# Patient Record
Sex: FEMALE | Race: WHITE | NOT HISPANIC OR LATINO | Employment: UNEMPLOYED
[De-identification: names, ages, dates, MRNs, and addresses within clinical notes are randomized per-mention and may not be internally consistent; named-entity substitution may affect disease eponyms.]

---

## 2017-09-17 ENCOUNTER — HEALTH MAINTENANCE LETTER (OUTPATIENT)
Age: 27
End: 2017-09-17

## 2019-11-08 ENCOUNTER — HEALTH MAINTENANCE LETTER (OUTPATIENT)
Age: 29
End: 2019-11-08

## 2020-02-23 ENCOUNTER — HEALTH MAINTENANCE LETTER (OUTPATIENT)
Age: 30
End: 2020-02-23

## 2020-12-06 ENCOUNTER — HEALTH MAINTENANCE LETTER (OUTPATIENT)
Age: 30
End: 2020-12-06

## 2021-07-06 ENCOUNTER — HOSPITAL ENCOUNTER (EMERGENCY)
Dept: EMERGENCY MEDICINE | Facility: CLINIC | Age: 31
Discharge: HOME | End: 2021-07-06
Attending: EMERGENCY MEDICINE
Payer: COMMERCIAL

## 2021-07-06 DIAGNOSIS — S09.90XA INJURY OF HEAD, INITIAL ENCOUNTER: ICD-10-CM

## 2021-07-06 DIAGNOSIS — S01.01XA LACERATION OF SCALP, INITIAL ENCOUNTER: ICD-10-CM

## 2021-07-06 ASSESSMENT — MIFFLIN-ST. JEOR: SCORE: 1959.2

## 2021-07-06 NOTE — ED TRIAGE NOTES
ED Triage Notes by Mare Caruso RN at 7/6/2021  3:29 PM     Author: Mare Caruso RN Service: -- Author Type: Registered Nurse    Filed: 7/6/2021  3:30 PM Date of Service: 7/6/2021  3:29 PM Status: Signed    : Mare Caruso, RN (Registered Nurse)       Patient was walking and tripped on the sidewalk and fell hitting her forehead, laceration noted. Denies blood thinner.

## 2021-07-07 NOTE — ED PROVIDER NOTES
ED Provider Notes by Billie Gupta DO at 7/6/2021  3:41 PM     Author: Billie Gupta DO Service: Emergency Medicine Author Type: Physician    Filed: 7/6/2021  9:24 PM Date of Service: 7/6/2021  3:41 PM Status: Signed    : Billei Gupta DO (Physician)       EMERGENCY DEPARTMENT ENCOUNTER      NAME: Yolis Lei  AGE: 31 y.o. female  YOB: 1990  MRN: [id number]  EVALUATION DATE & TIME: 7/6/2021  3:32 PM    PCP: Yareli Orellana    ED PROVIDER: Rebekah Gupta DO      Chief Complaint   Patient presents with   ? Fall   ? Head Laceration         FINAL IMPRESSION:  1. Laceration of scalp, initial encounter    2. Injury of head, initial encounter          ED COURSE & MEDICAL DECISION MAKING:    3:36 PM Met with patient for initial interview and exam. Discussed initial plan for care for their stay in the emergency department. PPE: Provider wore gloves and paper mask.  4:28 PM I checked to see if the patient was ready for the procedure.  4:33 PM I performed a nerve block.  4:51 PM I rechecked the patient and discussed findings.   4:56 PM I performed a laceration repair. I discussed with the patient the utility, limitations and findings of the exam/interventions/studies done during this visit as well as the list of differential diagnosis and symptoms to monitor/return to ER for.  Additional verbal discharge instructions were provided.    The patient was interviewed and examined.  History in the chart was reviewed.  31 y.o. female presents to the Emergency Department for evaluation of a fall and head injury.  She reports she is moving to a new house, was uneven concrete and she tripped and hit her head against the cement.  She is unsure if she passed out, however  states he saw this happened and by the time he got out of his car she was sitting up on the ground and awake.  No confusion.  She has had some mild headache since this happened.  No changes in vision.  She is not on any  anticoagulation.  Her neuro exam is nonfocal.  Given she is low risk, I do not feel that a head CT is warranted, however did offer and patient declined.  She does have a laceration above her left eyebrow, abrasions to her left knee and palms but otherwise a benign exam.  Her tetanus is up-to-date.  Wound was irrigated.  There is minimal gaping.  It is more amenable to Steri-Strips and glue.  This was performed.  Good approximation of the wound.  Discussed symptomatic care.  Discussed likely hematoma to the area and bruising.  Encourage return if any acute worsening of symptoms, confusion, seizure activity, neurologic deficits or any other concerns.    At the conclusion of the encounter I discussed  the results of all of the tests and the disposition with her.   All questions were answered.  The She acknowledged understanding and was involved in the decision making regarding the overall care plan.    MEDICATIONS GIVEN IN THE EMERGENCY:  Medications   lidocaine-epinephrine-tetracaine topical solution (LET) (3 mL Topical Given 7/6/21 1546)   methylcellulose powder 150 mg (150 mg Miscellaneous Given 7/6/21 1546)       NEW PRESCRIPTIONS STARTED AT TODAY'S ER VISIT  Discharge Medication List as of 7/6/2021  5:17 PM      CONTINUE these medications which have NOT CHANGED    Details   ibuprofen (ADVIL,MOTRIN) 800 MG tablet Take 1 tablet (800 mg total) by mouth 3 (three) times a day., Starting 8/31/2017, Until Discontinued, Print              =================================================================    HPI    Patient information was obtained from: the patient    Use of : N/A      Yolis Lei is a 31 y.o. female with a pertinent history of anxiety who presents to this ED by personal vehicle for evaluation of fall, head laceration.    The patient reports she was walking today when she tipped over a protruding piece of concrete.  She reports he head bounced 2x against the ground.  The patient denies loss of  consciousness.  Secondary to the fall, the patient reports a left knee abrasion, a laceration above her left eyebrow, bilateral abrasions to both palms, and an off/on migraine.  The patient denies the use of blood thinners.    The patient denies confusion, vomiting, nausea, vision changes, jaw problems, neck pain, gait problems, and any other symptoms or complaints at this time.      REVIEW OF SYSTEMS   Review of Systems   Constitutional: Negative for chills, fatigue and fever.   HENT: Negative for dental problem, facial swelling, nosebleeds, sore throat and trouble swallowing.         Negative for jaw problems.   Eyes: Negative for visual disturbance.   Respiratory: Negative for cough and shortness of breath.    Cardiovascular: Negative for chest pain.   Gastrointestinal: Negative for abdominal pain.   Musculoskeletal: Negative for arthralgias, gait problem and neck pain.   Skin: Positive for wound (left knee and bilateral palm abrasions). Negative for rash.        Positive for laceration above the left eyebrow.   Neurological: Positive for headaches ('migraines' - off/on). Negative for light-headedness and numbness.        Negative for loss of consciousness.   Psychiatric/Behavioral: Negative for confusion.   All other systems reviewed and are negative.      PAST MEDICAL HISTORY:  History reviewed. No pertinent past medical history.    PAST SURGICAL HISTORY:  History reviewed. No pertinent surgical history.        CURRENT MEDICATIONS:    No current facility-administered medications on file prior to encounter.      Current Outpatient Medications on File Prior to Encounter   Medication Sig   ? ibuprofen (ADVIL,MOTRIN) 800 MG tablet Take 1 tablet (800 mg total) by mouth 3 (three) times a day.       ALLERGIES:  No Known Allergies    FAMILY HISTORY:  History reviewed. No pertinent family history.    SOCIAL HISTORY:   Social History     Socioeconomic History   ? Marital status: Single     Spouse name: None   ? Number of  "children: None   ? Years of education: None   ? Highest education level: None   Occupational History   ? None   Social Needs   ? Financial resource strain: None   ? Food insecurity     Worry: None     Inability: None   ? Transportation needs     Medical: None     Non-medical: None   Tobacco Use   ? Smoking status: None   Substance and Sexual Activity   ? Alcohol use: None   ? Drug use: None   ? Sexual activity: None   Lifestyle   ? Physical activity     Days per week: None     Minutes per session: None   ? Stress: None   Relationships   ? Social connections     Talks on phone: None     Gets together: None     Attends [employer] service: None     Active member of club or organization: None     Attends meetings of clubs or organizations: None     Relationship status: None   ? Intimate partner violence     Fear of current or ex partner: None     Emotionally abused: None     Physically abused: None     Forced sexual activity: None   Other Topics Concern   ? None   Social History Narrative   ? None         PHYSICAL EXAM    VITAL SIGNS: /73 (Patient Position: Sitting)   Pulse 90   Temp 97  F (36.1  C) (Oral)   Resp 16   Ht 5' 3\" (1.6 m)   Wt (!) 280 lb (127 kg)   LMP 06/21/2021   SpO2 97%   BMI 49.60 kg/m     Constitutional:  Well developed, Well nourished,  HENT:  Normocephalic, Atraumatic, Bilateral external ears normal, No Hemotympanum, Oropharynx moist, No oral exudates, Nose normal. No facial trauma  Neck:  Normal range of motion, No c-spine tenderness, Supple, No stridor.   Eyes:  PERRL, EOMI, Conjunctiva normal, No discharge, Vision normal  Respiratory:  Equal breath sounds bilaterally, No respiratory distress, No wheezing, No chest tenderness or deformity.   Cardiovascular:  Normal heart rate, Normal rhythm, No murmurs, No rubs, No gallops.   GI:  Soft, No tenderness, No gaurding, No CVA tenderness, no echymosis.   Musculoskeletal:  Neurovascularly intact distally, No edema, No tenderness, No " cyanosis, No clubbing. Good range of motion in all major joints. No tenderness to palpation or major deformities noted.   Back:  No t-spine or l-spine tenderness, no step offs.   Integument:  Warm, Dry, No erythema, No rash. 1 cm laceration above the left eyebrow.  Superficial abrasion to left knee and bilateral palms.  Neurologic:  Alert & oriented x 3, Normal motor function, Normal sensory function, No focal deficits noted.   Psychiatric:  Affect normal, Judgment normal, Mood normal.        PROCEDURES:     PROCEDURE:  Laceration Repair   INDICATIONS: Laceration   PROCEDURE PROVIDER: Dr. Billie Gupta   SITE: Above the left eyebrow   TYPE/SIZE: A simple and superficial clean 1 cm laceration.   FUNCTIONAL ASSESSMENT: Distally/surrounding area sensation, circulation, motor and tendon function are intact.   MEDICATION: Lidocaine 1% with epinephrine.  Injecting 1 cc.   PREPARATION: irrigation with Normal Saline   DEBRIDEMENT: no debridement   CLOSURE:  Wound was closed in one layer. Skin closed with  Steristrips   Total number of steristrips placed: 4 steristrips and glue       I, Laz Serra, am serving as a scribe to document services personally performed by Dr. Gupta based on my observation and the provider's statements to me. IRebekah, DO attest that Laz Serra is acting in a scribe capacity, has observed my performance of the services and has documented them in accordance with my direction.    Rebekah Gupta DO  Emergency Medicine  [facility] EMERGENCY ROOM  [address] [facility] [address]  Dept: [phone]  Loc: [phone]     Billie Gupta DO  07/06/21 2127

## 2021-07-10 VITALS — BODY MASS INDEX: 49.61 KG/M2 | HEIGHT: 63 IN | WEIGHT: 280 LBS

## 2021-09-26 ENCOUNTER — HEALTH MAINTENANCE LETTER (OUTPATIENT)
Age: 31
End: 2021-09-26

## 2022-01-15 ENCOUNTER — HEALTH MAINTENANCE LETTER (OUTPATIENT)
Age: 32
End: 2022-01-15

## 2022-02-09 ENCOUNTER — OFFICE VISIT (OUTPATIENT)
Dept: OPHTHALMOLOGY | Facility: CLINIC | Age: 32
End: 2022-02-09
Payer: COMMERCIAL

## 2022-02-09 DIAGNOSIS — F07.81 POSTCONCUSSION SYNDROME: Primary | ICD-10-CM

## 2022-02-09 DIAGNOSIS — H52.12 MYOPIA, LEFT: ICD-10-CM

## 2022-02-09 DIAGNOSIS — H53.143 PHOTOPHOBIA OF BOTH EYES: ICD-10-CM

## 2022-02-09 DIAGNOSIS — H51.11 CONVERGENCE INSUFFICIENCY: ICD-10-CM

## 2022-02-09 PROCEDURE — 92004 COMPRE OPH EXAM NEW PT 1/>: CPT | Performed by: OPTOMETRIST

## 2022-02-09 PROCEDURE — 92015 DETERMINE REFRACTIVE STATE: CPT | Performed by: OPTOMETRIST

## 2022-02-09 ASSESSMENT — REFRACTION_WEARINGRX
SPECS_TYPE: SVL
OS_AXIS: 003
OS_CYLINDER: +0.50
OD_SPHERE: PLANO
OD_CYLINDER: SPHERE
OS_SPHERE: -1.00

## 2022-02-09 ASSESSMENT — VISUAL ACUITY
METHOD: SNELLEN - LINEAR
OD_CC: 20/20
OS_CC+: +1
OS_CC: 20/20

## 2022-02-09 ASSESSMENT — SLIT LAMP EXAM - LIDS
COMMENTS: NORMAL
COMMENTS: NORMAL

## 2022-02-09 ASSESSMENT — TONOMETRY
IOP_METHOD: ICARE
OD_IOP_MMHG: 22
OS_IOP_MMHG: 18

## 2022-02-09 ASSESSMENT — EXTERNAL EXAM - LEFT EYE: OS_EXAM: NORMAL

## 2022-02-09 ASSESSMENT — EXTERNAL EXAM - RIGHT EYE: OD_EXAM: NORMAL

## 2022-02-09 ASSESSMENT — REFRACTION_MANIFEST
OS_SPHERE: -1.00
OS_AXIS: 005
OS_CYLINDER: +0.50
OD_SPHERE: PLANO

## 2022-02-09 ASSESSMENT — CONF VISUAL FIELD
OD_NORMAL: 1
OS_NORMAL: 1
METHOD: COUNTING FINGERS

## 2022-02-09 ASSESSMENT — CUP TO DISC RATIO
OD_RATIO: 0.35
OS_RATIO: 0.35

## 2022-02-09 NOTE — PROGRESS NOTES
Assessment/Plan  (F07.81) Postconcussion syndrome  (primary encounter diagnosis)  Comment: Injured July 2021  Plan: Discussed findings with patient. Ocular health within normal limits. Some reduction in convergence, but unlikely that this is responsible for the bulk of patient's visual triggers and symptoms. Recommend trying FL-41 tint in habitual glasses to improve indoor light tolerance and reduce migraines. Follow up in about 1 month if symptoms fail to improve. Recommend patient continue with PT to improve motion sensitivity by improving fixation stability.      (H53.143) Photophobia of both eyes  Plan: See above plan. Try FL-41 glasses.     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 Recheck in 1 month if continued physical therapy for fixation instability and tinted glasses     (H52.12) Myopia, left  Plan: REFRACTION [[id number]]        SRx updated. Minimal change to distance Rx. Monitor.     Complete documentation of historical and exam elements from today's encounter can  be found in the full encounter summary report (not reduplicated in this progress  note). I personally obtained the chief complaint(s) and history of present illness. I  confirmed and edited as necessary the review of systems, past medical/surgical  history, family history, social history, and examination findings as documented by  others; and I examined the patient myself. I personally reviewed the relevant tests,  images, and reports as documented above. I formulated and edited as necessary the  assessment and plan and discussed the findings and management plan with the  patient and family.    Ovidio Burris OD

## 2022-02-09 NOTE — NURSING NOTE
"Chief Complaints and History of Present Illnesses   Patient presents with     COMPREHENSIVE EYE EXAM     Concussion Evaluation     Chief Complaint(s) and History of Present Illness(es)     COMPREHENSIVE EYE EXAM     Laterality: both eyes    Onset: 7 months ago    Context: h/o migraines    Associated symptoms: headache, floaters (historic - \"probably no changes\"), flashes (bright dots in vision - randomly appear - lasting a minute or two), previous episodes, photophobia, eye pain (dull achy - can get worse), dryness and nausea    Treatments tried: no treatments and glasses    Pain scale: 0/10              Concussion Evaluation               Comments     New pt here for concussion evaluation.   States was at here new home last July (2021), missed a step on the uneven pavement.  Pt hit her head, really hard on the cement. Had to have stitches for the face wound she incurred.  Pt has been managing HA's and dizziness are the biggest issues.    Ocular meds = none    Brian WERNER, JOSE 7:51 AM 02/09/2022                   "

## 2023-04-22 ENCOUNTER — HEALTH MAINTENANCE LETTER (OUTPATIENT)
Age: 33
End: 2023-04-22

## 2023-08-11 ENCOUNTER — TRANSCRIBE ORDERS (OUTPATIENT)
Dept: OTHER | Age: 33
End: 2023-08-11

## 2023-08-11 DIAGNOSIS — S06.0X9S CONCUSSION WITH LOSS OF CONSCIOUSNESS, SEQUELA (H): Primary | ICD-10-CM

## 2023-09-19 ENCOUNTER — OFFICE VISIT (OUTPATIENT)
Dept: OPTOMETRY | Facility: CLINIC | Age: 33
End: 2023-09-19
Attending: FAMILY MEDICINE
Payer: COMMERCIAL

## 2023-09-19 DIAGNOSIS — Z86.69 HISTORY OF MIGRAINE: ICD-10-CM

## 2023-09-19 DIAGNOSIS — H52.13 MYOPIA OF BOTH EYES: ICD-10-CM

## 2023-09-19 DIAGNOSIS — F07.81 POSTCONCUSSION SYNDROME: Primary | ICD-10-CM

## 2023-09-19 DIAGNOSIS — H53.143 PHOTOPHOBIA OF BOTH EYES: ICD-10-CM

## 2023-09-19 ASSESSMENT — EXTERNAL EXAM - RIGHT EYE: OD_EXAM: NORMAL

## 2023-09-19 ASSESSMENT — CONF VISUAL FIELD
OS_INFERIOR_TEMPORAL_RESTRICTION: 0
OD_INFERIOR_NASAL_RESTRICTION: 0
OD_SUPERIOR_NASAL_RESTRICTION: 0
OS_INFERIOR_NASAL_RESTRICTION: 0
OD_INFERIOR_TEMPORAL_RESTRICTION: 0
OS_NORMAL: 1
OD_NORMAL: 1
OD_SUPERIOR_TEMPORAL_RESTRICTION: 0
OS_SUPERIOR_TEMPORAL_RESTRICTION: 0
OS_SUPERIOR_NASAL_RESTRICTION: 0

## 2023-09-19 ASSESSMENT — REFRACTION_MANIFEST
OD_CYLINDER: +0.50
OD_SPHERE: -0.25
OS_AXIS: 003
OS_SPHERE: -1.00
OD_AXIS: 150
OS_CYLINDER: +0.50

## 2023-09-19 ASSESSMENT — VISUAL ACUITY
OS_CC: 20/20
OS_CC: J1+
OD_PH_CC: 20/25
OD_CC: J1+
OD_CC: 20/40-1
METHOD: SNELLEN - LINEAR

## 2023-09-19 ASSESSMENT — REFRACTION_WEARINGRX
OD_SPHERE: PLANO
OS_CYLINDER: +0.50
OS_SPHERE: -1.00
OS_AXIS: 005
SPECS_TYPE: SVL

## 2023-09-19 ASSESSMENT — EXTERNAL EXAM - LEFT EYE: OS_EXAM: NORMAL

## 2023-09-19 ASSESSMENT — CUP TO DISC RATIO
OS_RATIO: 0.4
OD_RATIO: 0.4

## 2023-09-19 ASSESSMENT — SLIT LAMP EXAM - LIDS
COMMENTS: 1+ MGD
COMMENTS: 1+ MGD

## 2023-09-19 ASSESSMENT — TONOMETRY
IOP_METHOD: ICARE
OS_IOP_MMHG: 12
OD_IOP_MMHG: 12

## 2023-09-20 NOTE — PROGRESS NOTES
Assessment/Plan  (F07.81) Postconcussion syndrome  (primary encounter diagnosis)  Comment: Injury in July 2021, patient continues to struggle with symptoms  Plan: Discussed findings with patient. Recommend obtaining glasses with FL-41 tint- prior pair may have blue blocker but does not appear to be FL-41. Return to clinic with prolonged symptoms or with any issues adjusting to new glasses.     (H53.143) Photophobia of both eyes  Comment: Following injury in July 2021  Plan: See above note.     (Z86.69) History of migraine  Plan: See above note. Because fluorescent lights are such a significant trigger, wearing tinted lenses under artificial lighting should be helpful.     (H52.13) Myopia of both eyes  Plan: REFRACTION [[id number]]        Discussed findings with patient. New spectacle prescription dispensed to patient. Patient is welcome to return to clinic with prolonged adaptation difficulties.             30 minutes were spent on the date of the encounter doing chart review, history and exam, documentation, and further activities as noted above.    Complete documentation of historical and exam elements from today's encounter can  be found in the full encounter summary report (not reduplicated in this progress  note). I personally obtained the chief complaint(s) and history of present illness. I  confirmed and edited as necessary the review of systems, past medical/surgical  history, family history, social history, and examination findings as documented by  others; and I examined the patient myself. I personally reviewed the relevant tests,  images, and reports as documented above. I formulated and edited as necessary the  assessment and plan and discussed the findings and management plan with the  patient and family.    Ovidio Burris, ASHLEY

## 2023-10-18 ENCOUNTER — TRANSCRIBE ORDERS (OUTPATIENT)
Dept: OTHER | Age: 33
End: 2023-10-18

## 2023-10-18 DIAGNOSIS — E66.01 MORBID OBESITY (H): Primary | ICD-10-CM

## 2024-01-15 ENCOUNTER — OFFICE VISIT (OUTPATIENT)
Dept: FAMILY MEDICINE | Facility: CLINIC | Age: 34
End: 2024-01-15
Payer: COMMERCIAL

## 2024-01-15 VITALS
DIASTOLIC BLOOD PRESSURE: 87 MMHG | BODY MASS INDEX: 51.91 KG/M2 | HEIGHT: 63 IN | TEMPERATURE: 98.9 F | HEART RATE: 94 BPM | SYSTOLIC BLOOD PRESSURE: 138 MMHG | WEIGHT: 293 LBS | OXYGEN SATURATION: 98 % | RESPIRATION RATE: 16 BRPM

## 2024-01-15 DIAGNOSIS — J01.90 ACUTE SINUSITIS WITH SYMPTOMS > 10 DAYS: Primary | ICD-10-CM

## 2024-01-15 PROCEDURE — 99213 OFFICE O/P EST LOW 20 MIN: CPT | Performed by: FAMILY MEDICINE

## 2024-01-15 RX ORDER — OMEPRAZOLE 40 MG/1
40 CAPSULE, DELAYED RELEASE ORAL PRN
COMMUNITY

## 2024-01-15 RX ORDER — ALBUTEROL SULFATE 90 UG/1
2 AEROSOL, METERED RESPIRATORY (INHALATION) EVERY 4 HOURS PRN
COMMUNITY
Start: 2023-07-31

## 2024-01-15 RX ORDER — BUSPIRONE HYDROCHLORIDE 7.5 MG/1
7.5 TABLET ORAL 2 TIMES DAILY
COMMUNITY
Start: 2023-07-31

## 2024-01-15 ASSESSMENT — PATIENT HEALTH QUESTIONNAIRE - PHQ9
SUM OF ALL RESPONSES TO PHQ QUESTIONS 1-9: 6
10. IF YOU CHECKED OFF ANY PROBLEMS, HOW DIFFICULT HAVE THESE PROBLEMS MADE IT FOR YOU TO DO YOUR WORK, TAKE CARE OF THINGS AT HOME, OR GET ALONG WITH OTHER PEOPLE: SOMEWHAT DIFFICULT
SUM OF ALL RESPONSES TO PHQ QUESTIONS 1-9: 6

## 2024-01-15 NOTE — PROGRESS NOTES
"  Assessment & Plan   Problem List Items Addressed This Visit    None  Visit Diagnoses       Acute sinusitis with symptoms > 10 days    -  Primary    Relevant Medications    albuterol (PROAIR HFA/PROVENTIL HFA/VENTOLIN HFA) 108 (90 Base) MCG/ACT inhaler    amoxicillin-clavulanate (AUGMENTIN) 875-125 MG tablet           I explained to the patient that her ear exam is quite normal today and that this points towards her ear symptoms likely being due to eustachian tube dysfunction.  This would also suggest that she still has a fair amount of inflammation going on from residual COVID infection.  We discussed that I cannot confirm that she has a bacterial sinus infection, however, with this being the prominent symptom over 2 weeks out from onset of symptoms, and that I do think that it is reasonable to empirically treat with a course of antibiotic at this time.       BMI:   Estimated body mass index is 52.71 kg/m  as calculated from the following:    Height as of this encounter: 1.6 m (5' 3\").    Weight as of this encounter: 135 kg (297 lb 9 oz).       MORRIS BASSETT MD  [facility]   Yolis is a 33 year old who presents today with a chief complaint of bilateral ear pain and sinus symptoms.  She reports onset of illness back on December 29 at which time she felt significant sinus pain and pressure, throat swelling and ear pain.  She took a home COVID test and it was positive.  She has developed a cough but states that the cough is dry and not the most bothersome symptom.  She is now over 2 weeks out and continues to have bilateral ear pain but more prominent sinus drainage including nasal drainage as well as postnasal drip.  She describes thick and at times green nasal drainage as well as intermittent sinus pain and pressure in her maxillary sinuses bilaterally.  She does continue to have hoarseness in her voice and has mentioned a dry cough.  She does not have any fevers or chills " or other complaints.  She states that she is prone to getting sinus infections each year.  Ear Problem (Bilat Ear pain L>R. Sx started 12/29/23.) and Sinus Problem (Nasal drainage. Sx started 12/29/23. 12/31/23 POS Home covid test. Hoarse voice started then too. Neg Covid test 01/04 and 01/05/24.)      1/15/2024     9:02 AM   Additional Questions   Roomed by sac   Accompanied by self         1/15/2024     9:02 AM   Patient Reported Additional Medications   Patient reports taking the following new medications no       Ear Problem    Sinus Problem   Associated symptoms include ear pain.   History of Present Illness       Reason for visit:  Possible sinus and ear infection.also laryngitis  Symptom onset:  3-4 weeks ago  Symptoms include:  Ear pain in both ears, nasal drainage, laryngitis  Symptom intensity:  Moderate  Symptom progression:  Staying the same  Had these symptoms before:  Yes  Has tried/received treatment for these symptoms:  Yes  Previous treatment was successful:  Yes  Prior treatment description:  Antibiotic of some sort for infection  What makes it worse:  Not getting better  What makes it better:  After nia tried everything at home for a month im fairly positive an antibiotic will help    She eats 2-3 servings of fruits and vegetables daily.She consumes 1 sweetened beverage(s) daily.She exercises with enough effort to increase her heart rate 20 to 29 minutes per day.  She exercises with enough effort to increase her heart rate 3 or less days per week. She is missing 7 dose(s) of medications per week.  She is not taking prescribed medications regularly due to remembering to take.         Review of Systems   HENT:  Positive for ear pain.             Objective    LMP 12/16/2023 (Exact Date)   There is no height or weight on file to calculate BMI.  Physical Exam   GENERAL: healthy, alert and no distress  HENT: ear canals and TM's normal, nose and mouth without ulcers or lesions  NECK: no adenopathy, no  asymmetry, masses, or scars and thyroid normal to palpation  RESP: lungs clear to auscultation - no rales, rhonchi or wheezes  CV: regular rate and rhythm, normal S1 S2, no S3 or S4, no murmur, click or rub, no peripheral edema and peripheral pulses strong

## 2024-01-15 NOTE — COMMUNITY RESOURCES LIST (ENGLISH)
01/15/2024   [facility]  N/A  For questions about this resource list or additional care needs, please contact your primary care clinic or care manager.  Phone: [phone]   Email: N/A   Address: [address] [place] [address]   Hours: N/A        Food and Nutrition       Food pantry  1  [facility] [place] - Food Shelf Distance: 2.97 miles      [facility]   [address] [place] [address]  Language: English  Hours: Mon - Wed 9:30 AM - 2:30 PM  Fees: Free   Phone: (740) 804-6851 Email: jeyson@[facility] Website: http://[facility]/[place]/     2  [facility] Distance: 2.97 miles      In-Person   [address] [place] [address]  Language: English, Latvian  Hours: Mon - Thu 9:30 AM - 3:00 PM  Fees: Free   Phone: (998) 144-8166 Email: center@[facility] Website: https://www.[facility]-resource-[place]/     SNAP application assistance  3  [employer] [facility] Distance: 6.57 miles      In-Person   [address] [place] [address]  Language: English, Latvian  Hours: Mon - Fri 8:30 AM - 5:00 PM  Fees: Free   Phone: (996) 215-4663 Email: info@clues.org Website: http://www.clues.org     4  [facility] Economic Support [facility] Distance: 6.91 miles      In-Person, Phone/[employer]   [address] [place] [address]  Language: English  Hours: Mon - Fri 8:00 AM - 4:30 PM  Fees: Free   Phone: (410) 964-3936 Email: sukhjinder@[facility]. Website: https://www.[facility].us/787/Economic-Support     Soup kitchen or free meals  5  [facility] - Thursday Night Community Meal Distance: 2.97 miles      In-Person   [address] [place] [address]  Language: English, Latvian  Hours: Thu 6:00 PM - 7:00 PM   Fees: Free   Phone: (828) 985-6296 Email: center@[facility] Website: https://www.[facility].[employer]/[facility]-resource-[place]/     6  [facility] - Homeless Outreach Services Team - Food Assistance Distance: 3.08 miles      Pickup   [address] [place] [address]  Language: English  Hours: Mon - Thu 9:30 AM - 3:30 PM  Fees: Free   Phone: (303) 489-4235 Email: office@[facility]"[employer] Website: https://www.[facility]"[employer]/Community Healthresource-[place]/          Transportation       Free or low-cost transportation  7  [employer] - Free or low-cost transportation Distance: 4.54 miles      In-Person   [address] [place] [address]  Language: English  Hours: Mon - Fri 9:00 AM - 5:00 PM  Fees: Free   Phone: (705) 442-9088 Email: info@[employer] Website: https://www.[employer]/     8  [facility]  Office - [facility] - Gas vouchers and transportation assistance - Free or low-cost transportation Distance: 6.34 miles      In-Person, Phone/Virtual   [address] [place] [address]  Language: English  Hours: Mon - Fri 8:00 AM - 12:00 PM , Mon - Fri 1:00 PM - 4:00 PM  Fees: Free   Phone: (622) 404-7010 Email: garrett@[facility].org Website: https://[facility].org/[facility]/social-services-office-[place]/     Transportation to medical appointments  9  [employer] Distance: 7.27 miles      In-Person   [address] [address]  Language: English  Hours: Mon - Thu 6:00 AM - 6:00 PM , Fri 6:00 AM - 5:00 PM  Fees: Insurance, Self Pay   Phone: (409) 750-2580 Email: office@[employer] Website: https://www.[employer]/     10  [facility] - Non-Emergency Medical Transportation Distance: 8.93 miles      In-Person   [address] [place] [address]  Language: English  Hours: Mon - Fri 8:00 AM - 4:00 PM Appt. Only  Fees: Self Pay   Phone: (291)  834-7295 Website: http://[facility].org/Medical-Services/Emergency-medical-services/Non-emergency-transportation/          Important Numbers & Websites       Emergency Services   911  [facility]   311  Poison Control   (670) 772-5934  Suicide Prevention Lifeline   (842) 424-7292 (TALK)  Child Abuse Hotline   (321) 448-7038 (4-A-Child)  Sexual Assault Hotline   (734) 220-3437 (HOPE)  National Runaway Safeline   (985) 589-1552 (RUNAWAY)  [employer] Talkline   (242) 142-6428  Substance Abuse Referral   (858) 102-5553 (HELP)

## 2024-01-18 ENCOUNTER — APPOINTMENT (OUTPATIENT)
Dept: OPTOMETRY | Facility: CLINIC | Age: 34
End: 2024-01-18
Payer: COMMERCIAL

## 2024-01-18 PROCEDURE — 92340 FIT SPECTACLES MONOFOCAL: CPT | Performed by: OPTOMETRIST

## 2024-06-29 ENCOUNTER — HEALTH MAINTENANCE LETTER (OUTPATIENT)
Age: 34
End: 2024-06-29

## 2024-08-14 ENCOUNTER — OFFICE VISIT (OUTPATIENT)
Dept: FAMILY MEDICINE | Facility: CLINIC | Age: 34
End: 2024-08-14
Payer: COMMERCIAL

## 2024-08-14 VITALS
RESPIRATION RATE: 18 BRPM | TEMPERATURE: 98 F | SYSTOLIC BLOOD PRESSURE: 123 MMHG | HEART RATE: 89 BPM | OXYGEN SATURATION: 100 % | DIASTOLIC BLOOD PRESSURE: 88 MMHG

## 2024-08-14 DIAGNOSIS — L02.01 FACIAL ABSCESS: Primary | ICD-10-CM

## 2024-08-14 PROCEDURE — 99213 OFFICE O/P EST LOW 20 MIN: CPT

## 2024-08-14 RX ORDER — SULFAMETHOXAZOLE/TRIMETHOPRIM 800-160 MG
1 TABLET ORAL 2 TIMES DAILY
Qty: 14 TABLET | Refills: 0 | Status: SHIPPED | OUTPATIENT
Start: 2024-08-14 | End: 2024-08-21

## 2024-08-14 NOTE — PROGRESS NOTES
Assessment & Plan     Facial abscess  34-year-old with facial abscess for 5 days duration that initially was worsening and then got better.  Did have clear drainage.  Not draining currently.  On palpation it is raised but no clear fluctuance.  Will hold off from incision and drainage given location for now and treat conservatively with Bactrim.  Patient to follow-up if not improving or worsening.  - sulfamethoxazole-trimethoprim (BACTRIM DS) 800-160 MG tablet; Take 1 tablet by mouth 2 times daily for 7 days    Return if symptoms worsen or fail to improve.    Subjective   Yolis is a 34 year old, presenting for the following health issues:  Insect Bites (Saturday insect bite on fore head swollen and painfully.)    HPI     Rash  Onset/Duration: 5 days  Description  Location: Forehead  Character: round, raised, red, clear draining fluid  Itching: mild  Intensity:  mild  Progression of Symptoms:  worsening and then improving  Accompanying signs and symptoms:   Fever: No  Body aches or joint pain: No  Sore throat symptoms: No  Recent cold symptoms: No  History:           Previous episodes of similar rash: None  New exposures:  None  Recent travel: No  Exposure to similar rash: No  Precipitating or alleviating factors: None  Therapies tried and outcome: none      Review of Systems  Constitutional, HEENT, cardiovascular, pulmonary, gi and gu systems are negative, except as otherwise noted.      Objective    /88 (BP Location: Right arm, Patient Position: Sitting, Cuff Size: Adult Regular)   Pulse 89   Temp 98  F (36.7  C) (Oral)   Resp 18   SpO2 100%   There is no height or weight on file to calculate BMI.  Physical Exam   GENERAL: alert and no distress  NECK: no adenopathy, no asymmetry, masses, or scars  RESP: lungs clear to auscultation - no rales, rhonchi or wheezes  CV: regular rate and rhythm, normal S1 S2, no S3 or S4, no murmur, click or rub, no peripheral edema  ABDOMEN: soft, nontender, no  hepatosplenomegaly, no masses and bowel sounds normal  MS: no gross musculoskeletal defects noted, no edema  SKIN: Raised roughly 1.5 to 2 cm in circumference lesion in between the eyebrows that is raised but not particularly fluctuant        Signed Electronically by: Philipp Ny, DO

## 2024-08-26 ASSESSMENT — SLEEP AND FATIGUE QUESTIONNAIRES
HOW LIKELY ARE YOU TO NOD OFF OR FALL ASLEEP WHILE SITTING AND READING: SLIGHT CHANCE OF DOZING
HOW LIKELY ARE YOU TO NOD OFF OR FALL ASLEEP WHILE SITTING QUIETLY AFTER LUNCH WITHOUT ALCOHOL: WOULD NEVER DOZE
HOW LIKELY ARE YOU TO NOD OFF OR FALL ASLEEP WHILE SITTING AND TALKING TO SOMEONE: WOULD NEVER DOZE
HOW LIKELY ARE YOU TO NOD OFF OR FALL ASLEEP WHILE WATCHING TV: WOULD NEVER DOZE
HOW LIKELY ARE YOU TO NOD OFF OR FALL ASLEEP WHEN YOU ARE A PASSENGER IN A CAR FOR AN HOUR WITHOUT A BREAK: WOULD NEVER DOZE
HOW LIKELY ARE YOU TO NOD OFF OR FALL ASLEEP WHILE SITTING INACTIVE IN A PUBLIC PLACE: WOULD NEVER DOZE
HOW LIKELY ARE YOU TO NOD OFF OR FALL ASLEEP WHILE LYING DOWN TO REST IN THE AFTERNOON WHEN CIRCUMSTANCES PERMIT: WOULD NEVER DOZE
HOW LIKELY ARE YOU TO NOD OFF OR FALL ASLEEP IN A CAR, WHILE STOPPED FOR A FEW MINUTES IN TRAFFIC: WOULD NEVER DOZE

## 2024-08-27 ENCOUNTER — OFFICE VISIT (OUTPATIENT)
Dept: SURGERY | Facility: CLINIC | Age: 34
End: 2024-08-27
Payer: COMMERCIAL

## 2024-08-27 VITALS
SYSTOLIC BLOOD PRESSURE: 124 MMHG | WEIGHT: 293 LBS | HEIGHT: 65 IN | DIASTOLIC BLOOD PRESSURE: 78 MMHG | BODY MASS INDEX: 48.82 KG/M2

## 2024-08-27 DIAGNOSIS — M79.672 PAIN IN BOTH FEET: ICD-10-CM

## 2024-08-27 DIAGNOSIS — M79.671 PAIN IN BOTH FEET: ICD-10-CM

## 2024-08-27 DIAGNOSIS — E66.01 SEVERE OBESITY (BMI >= 40) (H): Primary | ICD-10-CM

## 2024-08-27 DIAGNOSIS — G43.809 OTHER MIGRAINE WITHOUT STATUS MIGRAINOSUS, NOT INTRACTABLE: ICD-10-CM

## 2024-08-27 DIAGNOSIS — M54.50 LOW BACK PAIN, UNSPECIFIED BACK PAIN LATERALITY, UNSPECIFIED CHRONICITY, UNSPECIFIED WHETHER SCIATICA PRESENT: ICD-10-CM

## 2024-08-27 DIAGNOSIS — L68.0 HIRSUTISM: ICD-10-CM

## 2024-08-27 DIAGNOSIS — E66.01 MORBID OBESITY (H): ICD-10-CM

## 2024-08-27 DIAGNOSIS — K21.9 GASTROESOPHAGEAL REFLUX DISEASE WITHOUT ESOPHAGITIS: ICD-10-CM

## 2024-08-27 DIAGNOSIS — E28.2 PCOS (POLYCYSTIC OVARIAN SYNDROME): ICD-10-CM

## 2024-08-27 PROCEDURE — 99204 OFFICE O/P NEW MOD 45 MIN: CPT | Performed by: FAMILY MEDICINE

## 2024-08-27 RX ORDER — PROPRANOLOL HYDROCHLORIDE 40 MG/1
TABLET ORAL
COMMUNITY
Start: 2024-08-07

## 2024-08-27 RX ORDER — ELETRIPTAN HYDROBROMIDE 40 MG/1
TABLET, FILM COATED ORAL
COMMUNITY
Start: 2024-08-07

## 2024-08-27 NOTE — LETTER
"2024      Yolis Lei  [address]  [place] [address]      Dear Colleague,    Thank you for referring your patient, Yolis Lei, to the [facility] [place]. Please see a copy of my visit note below.        New Medical Weight Management Consult    PATIENT:  Yolis Lei  MRN:         [id number]  :         1990  ANISHA:         2024    Dear Dr. Bertrand,    I had the pleasure of seeing your patient, Yolis Lei. Full intake/assessment was done to determine barriers to weight loss success and develop a treatment plan. Yolis Lei is a 34 year old female interested in treatment of medical problems associated with excess weight. She has a height of 5' 4.5\", a weight of 297 lbs 0 oz, and the calculated Body mass index is 50.19 kg/m .     ASSESSMENT/PLAN:  Genetic Predisposition/ PCOS with insulin resistance, hirsutism, hx ovarian cysts, menstrual irregularity  Secondary infertility  High Stress  Weight loss from 310# to current 297 as Fiance also meeting with RD and has accompanied him and made significant changes, losing 13#.   Has been on Metformin in the past  Has not been able to get pregnant after having Geronimo 10 years ago.      RD for MNT  Zepbound, Plan B Phentermine and metformin. Consider adding Topamax if unable to eliminate soda as it can help with migrane and work synergistically with phentermine(not if not using birth control)  Labs  During off season- counting steps will be helpful-explored family intentional activities such as walks after work, [place], [place].    We discussed Bariatric Basics including:  -eating 3 meals daily  -eating protein first  -eating slowly, chewing food well  -avoiding/limiting calorie containing beverages  -choosing wheat, not white with breads, crackers, pastas, adarsh, bagels, tortillas, rice  -limiting restaurant or cafeteria eating to twice a week or less    We discussed the importance " "of restorative sleep and stress management in maintaining a healthy weight.    We reviewed medications associated with weight gain.    We discussed insulin resistance and glycemic index as it relates to appetite and weight control.     We discussed the National Weight Control Registry healthy weight maintenance strategies and ways to optimize metabolism.  We discussed the importance of physical activity including cardiovascular and strength training in maintaining a healthier weight and explored viable options.    We discussed medications available for weight loss including Phentermine, Phendimetrazine, Topamax, Qsymia, Diethylproprion, Orlistat, Contrave (Bupropion/Naltrexone), Saxenda (Liraglutide), Wegovy (Semaglutide), Zepbound (Tirzepatide) and Vyvanse (for Binge Eating Disorder). We discussed the risks and benefits of each. We discussed indications, contraindications, potential side effects, and estimated costs of each. Literature was provided. Yolis understands that not using a weight loss medication is an option.        She has the following co-morbidities:        8/26/2024    12:58 PM   --   I have the following health issues associated with obesity Polycystic Ovarian Syndrome   I have the following symptoms associated with obesity Infertility (Difficulty Getting Pregnant)    Depression    Lower Extremity Swelling    Back Pain    Hip Pain               8/26/2024    12:58 PM   Referring Provider   Please name the provider who referred you to Medical Weight Management  If you do not know, please answer \"I Don't Know\" I dont know           8/26/2024    12:58 PM   Weight History   How concerned are you about your weight? Very Concerned   I became overweight As an Adult   The following factors have contributed to my weight gain Lack of Exercise    Stress    Other   Please list the other factors PCOS   I have tried the following methods to lose weight Watching Portions or Calories    Exercise   My lowest weight " since age 19 was 150   My highest weight since age 19 was 300   The most weight I have ever lost was (lbs) 20   I have the following family history of obesity/being overweight My mother is overweight   How has your weight changed over the last year? Gained   How many pounds? 20           8/26/2024    12:58 PM   Diet Recall Review with Patient   If you do eat breakfast, what types of food do you eat? Eggs, yogurt, sausage   If you do eat lunch, what types of food do you typically eat? Chicken sandwich, salads, burger   If you do eat supper, what types of food do you typically eat? Chicken, small portion of potatoes, broccoli, corn, beans   How many glasses of juice do you drink in a typical day? 0   How many of glasses of milk do you drink in a typical day? 1   If you do drink milk, what type? 1%   How many 8oz glasses of sugar containing drinks such as Jarocho-Aid/sweet tea do you drink in a day? 0   How many cans/bottles of sugar pop/soda/tea/sports drinks do you drink in a day? 1   How many cans/bottles of diet pop/soda/tea or sports drink do you drink in a day? 1   How often do you have a drink of alcohol? Monthly or Less   If you do drink, how many drinks might you have in a day? 1 or 2           8/26/2024    12:58 PM   Eating Habits   Generally, my meals include foods like these bread, pasta, rice, potatoes, corn, crackers, sweet dessert, pop, or juice A Few Times a Week   Generally, my meals include foods like these fried meats, brats, burgers, french fries, pizza, cheese, chips, or ice cream A Few Times a Week   Eat fast food (like McDonalds, Burger Dipak, Taco Bell) Once a Week   Eat at a buffet or sit-down restaurant Less Than Weekly   Eat most of my meals in front of the TV or computer Less Than Weekly   Often skip meals, eat at random times, have no regular eating times A Few Times a Week   Rarely sit down for a meal but snack or graze throughout Less Than Weekly   Eat extra snacks between meals Less Than  Weekly   Eat most of my food at the end of the day Never   Eat in the middle of the night or wake up at night to eat Less Than Weekly   Eat extra snacks to prevent or correct low blood sugar Less Than Weekly   Eat to prevent acid reflux or stomach pain Never   Worry about not having enough food to eat Never   I eat when I am depressed Once a Week   I eat when I am stressed Less Than Weekly   I eat when I am bored Less Than Weekly   I eat when I am anxious Never   I eat when I am happy or as a reward Less Than Weekly   I feel hungry all the time even if I just have eaten Never   Feeling full is important to me Never   I finish all the food on my plate even if I am already full Never   I can't resist eating delicious food or walk past the good food/smell Never   I eat/snack without noticing that I am eating Never   I eat when I am preparing the meal Never   I eat more than usual when I see others eating Never   I have trouble not eating sweets, ice cream, cookies, or chips if they are around the house Never   I think about food all day Never   What foods, if any, do you crave? Cheese   Please list any other foods you crave? I dont really crave anything.           8/26/2024    12:58 PM   Amount of Food   I feel out of control when eating Never   I eat a large amount of food, like a loaf of bread, a box of cookies, a pint/quart of ice cream, all at once Never   I eat a large amount of food even when I am not hungry Never   I eat rapidly Never   I eat alone because I feel embarrassed and do not want others to see how much I have eaten Never   I eat until I am uncomfortably full Never   I feel bad, disgusted, or guilty after I overeat Monthly           8/26/2024    12:58 PM   Activity/Exercise History   How much of a typical 12 hour day do you spend sitting? Less Than Half the Day   How much of a typical 12 hour day do you spend lying down? Less Than Half the Day   How much of a typical day do you spend walking/standing?  Most of the Day   How many hours (not including work) do you spend on the TV/Video Games/Computer/Tablet/Phone? 2-3 Hours   How many times a week are you active for the purpose of exercise? Never   What keeps you from being more active? Other   How many total minutes do you spend doing some activity for the purpose of exercising when you exercise? Less Than 15 Minutes       PAST MEDICAL HISTORY:  Past Medical History:   Diagnosis Date     Foot pain      Gastroesophageal reflux disease without esophagitis      Hirsutism      Low back pain      Migraine      PCOS (polycystic ovarian syndrome)      Severe obesity (BMI >= 40) (H)            8/26/2024    12:58 PM   Work/Social History Reviewed With Patient   My employment status is Part-Time   My job is    How much of your job is spent on the computer or phone? Less Than 50%   How many hours do you spend commuting to work daily? 1 hour   What is your marital status? /In a Relationship   If in a relationship, is your significant other overweight? Yes   If you have children, are they overweight? No   Who do you live with? My fiance and son   Who does the food shopping? Both of us           8/26/2024    12:58 PM   Mental Health History Reviewed With Patient   Have you ever been physically or sexually abused? No   How often in the past 2 weeks have you felt little interest or pleasure in doing things? For Several Days   Over the past 2 weeks how often have you felt down, depressed, or hopeless? For Several Days           8/26/2024    12:58 PM   Sleep History Reviewed With Patient   How many hours do you sleep at night? 7       MEDICATIONS:   Current Outpatient Medications   Medication Sig Dispense Refill     albuterol (PROAIR HFA/PROVENTIL HFA/VENTOLIN HFA) 108 (90 Base) MCG/ACT inhaler Inhale 2 puffs into the lungs every 4 hours as needed       busPIRone (BUSPAR) 7.5 MG tablet Take 7.5 mg by mouth 2 times daily       IBU OR None Entered        "omeprazole (PRILOSEC) 40 MG DR capsule Take 40 mg by mouth as needed       propranolol (INDERAL) 40 MG tablet TAKE 2 TABLETS BY MOUTH IN THE MORNING AND 2 TABLETS AT BEDTIME*       RELPAX 40 MG tablet TAKE 1 TABLET BY MOUTH AT THE ONSET OF MIGRAINE.  OK TO REPEAT IN 2 HOURS.  DO NOT EXCEED 2 TABS IN 24 HOURS.*       [START ON 11/19/2024] tirzepatide-Weight Management (ZEPBOUND) 10 MG/0.5ML prefilled pen Inject 0.5 mLs (10 mg) subcutaneously every 7 days for 28 days. Do not start before November 19, 2024. 2 mL 0     [START ON 12/17/2024] tirzepatide-Weight Management (ZEPBOUND) 12.5 MG/0.5ML prefilled pen Inject 0.5 mLs (12.5 mg) subcutaneously every 7 days for 28 days. Do not start before December 17, 2024. 2 mL 0     [START ON 1/14/2025] tirzepatide-Weight Management (ZEPBOUND) 15 MG/0.5ML prefilled pen Inject 0.5 mLs (15 mg) subcutaneously every 7 days. Do not start before January 14, 2025. 6 mL 3     tirzepatide-Weight Management (ZEPBOUND) 2.5 MG/0.5ML prefilled pen Inject 0.5 mLs (2.5 mg) subcutaneously every 7 days for 28 days. 2 mL 0     [START ON 9/24/2024] tirzepatide-Weight Management (ZEPBOUND) 5 MG/0.5ML prefilled pen Inject 0.5 mLs (5 mg) subcutaneously every 7 days for 28 days. Do not start before September 24, 2024. 2 mL 0     [START ON 10/22/2024] tirzepatide-Weight Management (ZEPBOUND) 7.5 MG/0.5ML prefilled pen Inject 0.5 mLs (7.5 mg) subcutaneously every 7 days for 28 days. Do not start before October 22, 2024. 2 mL 0     TYLENOL OR as directed         ALLERGIES:   No Known Allergies    PHYSICAL EXAM:  /78   Ht 1.638 m (5' 4.5\")   Wt 134.7 kg (297 lb)   LMP 07/26/2024 (Exact Date)   BMI 50.19 kg/m      Waist circumference: 56 cm (hips 60)    Wt Readings from Last 4 Encounters:   08/27/24 134.7 kg (297 lb)   01/15/24 135 kg (297 lb 9 oz)   08/05/09 85.4 kg (188 lb 3.2 oz) (96%, Z= 1.80)*   10/16/08 89.8 kg (198 lb) (98%, Z= 1.97)*     * Growth percentiles are based on CDC (Girls, 2-20 " "Years) data.   Pleasant and in no distress  Neck 15\" Mallampati 2-3+  Heart regular  Lungs clear  Abdominal circumference 56\"  A & O x 3  Euthymic  Gait normal  Skin hirsutism    FOLLOW-UP:   Scheduled     TIME: 45 min spent on evaluation, management, counseling, education, & motivational interviewing    Sincerely,    Karissa Morales MD            Again, thank you for allowing me to participate in the care of your patient.        Sincerely,        Karissa Morales MD  "

## 2024-08-27 NOTE — PROGRESS NOTES
"    New Medical Weight Management Consult    PATIENT:  Yolis Lei  MRN:         [id number]  :         1990  ANISHA:         2024    Dear Dr. Bertrand,    I had the pleasure of seeing your patient, Yolis Lei. Full intake/assessment was done to determine barriers to weight loss success and develop a treatment plan. Yolis Lei is a 34 year old female interested in treatment of medical problems associated with excess weight. She has a height of 5' 4.5\", a weight of 297 lbs 0 oz, and the calculated Body mass index is 50.19 kg/m .     ASSESSMENT/PLAN:  Genetic Predisposition/ PCOS with insulin resistance, hirsutism, hx ovarian cysts, menstrual irregularity  Secondary infertility  High Stress  Weight loss from 310# to current 297 as Fiance also meeting with RD and has accompanied him and made significant changes, losing 13#.   Has been on Metformin in the past  Has not been able to get pregnant after having Geronimo 10 years ago.      RD for MNT  Zepbound, Plan B Phentermine and metformin. Consider adding Topamax if unable to eliminate soda as it can help with migrane and work synergistically with phentermine(not if not using birth control)  Labs  During off season- counting steps will be helpful-explored family intentional activities such as walks after work, [place]    We discussed Bariatric Basics including:  -eating 3 meals daily  -eating protein first  -eating slowly, chewing food well  -avoiding/limiting calorie containing beverages  -choosing wheat, not white with breads, crackers, pastas, adarsh, bagels, tortillas, rice  -limiting restaurant or cafeteria eating to twice a week or less    We discussed the importance of restorative sleep and stress management in maintaining a healthy weight.    We reviewed medications associated with weight gain.    We discussed insulin resistance and glycemic index as it relates to appetite and weight control.     We discussed the " "National Weight Control Registry healthy weight maintenance strategies and ways to optimize metabolism.  We discussed the importance of physical activity including cardiovascular and strength training in maintaining a healthier weight and explored viable options.    We discussed medications available for weight loss including Phentermine, Phendimetrazine, Topamax, Qsymia, Diethylproprion, Orlistat, Contrave (Bupropion/Naltrexone), Saxenda (Liraglutide), Wegovy (Semaglutide), Zepbound (Tirzepatide) and Vyvanse (for Binge Eating Disorder). We discussed the risks and benefits of each. We discussed indications, contraindications, potential side effects, and estimated costs of each. Literature was provided. Yolis understands that not using a weight loss medication is an option.        She has the following co-morbidities:        8/26/2024    12:58 PM   --   I have the following health issues associated with obesity Polycystic Ovarian Syndrome   I have the following symptoms associated with obesity Infertility (Difficulty Getting Pregnant)    Depression    Lower Extremity Swelling    Back Pain    Hip Pain               8/26/2024    12:58 PM   Referring Provider   Please name the provider who referred you to Medical Weight Management  If you do not know, please answer \"I Don't Know\" I dont know           8/26/2024    12:58 PM   Weight History   How concerned are you about your weight? Very Concerned   I became overweight As an Adult   The following factors have contributed to my weight gain Lack of Exercise    Stress    Other   Please list the other factors PCOS   I have tried the following methods to lose weight Watching Portions or Calories    Exercise   My lowest weight since age 19 was 150   My highest weight since age 19 was 300   The most weight I have ever lost was (lbs) 20   I have the following family history of obesity/being overweight My mother is overweight   How has your weight changed over the last year? " Gained   How many pounds? 20           8/26/2024    12:58 PM   Diet Recall Review with Patient   If you do eat breakfast, what types of food do you eat? Eggs, yogurt, sausage   If you do eat lunch, what types of food do you typically eat? Chicken sandwich, salads, burger   If you do eat supper, what types of food do you typically eat? Chicken, small portion of potatoes, broccoli, corn, beans   How many glasses of juice do you drink in a typical day? 0   How many of glasses of milk do you drink in a typical day? 1   If you do drink milk, what type? 1%   How many 8oz glasses of sugar containing drinks such as Jarocho-Aid/sweet tea do you drink in a day? 0   How many cans/bottles of sugar pop/soda/tea/sports drinks do you drink in a day? 1   How many cans/bottles of diet pop/soda/tea or sports drink do you drink in a day? 1   How often do you have a drink of alcohol? Monthly or Less   If you do drink, how many drinks might you have in a day? 1 or 2           8/26/2024    12:58 PM   Eating Habits   Generally, my meals include foods like these bread, pasta, rice, potatoes, corn, crackers, sweet dessert, pop, or juice A Few Times a Week   Generally, my meals include foods like these fried meats, brats, burgers, french fries, pizza, cheese, chips, or ice cream A Few Times a Week   Eat fast food (like [employer], [facility], [employer]) Once a Week   Eat at a buffet or sit-down restaurant Less Than Weekly   Eat most of my meals in front of the TV or computer Less Than Weekly   Often skip meals, eat at random times, have no regular eating times A Few Times a Week   Rarely sit down for a meal but snack or graze throughout Less Than Weekly   Eat extra snacks between meals Less Than Weekly   Eat most of my food at the end of the day Never   Eat in the middle of the night or wake up at night to eat Less Than Weekly   Eat extra snacks to prevent or correct low blood sugar Less Than Weekly   Eat to prevent acid reflux or stomach pain  Never   Worry about not having enough food to eat Never   I eat when I am depressed Once a Week   I eat when I am stressed Less Than Weekly   I eat when I am bored Less Than Weekly   I eat when I am anxious Never   I eat when I am happy or as a reward Less Than Weekly   I feel hungry all the time even if I just have eaten Never   Feeling full is important to me Never   I finish all the food on my plate even if I am already full Never   I can't resist eating delicious food or walk past the good food/smell Never   I eat/snack without noticing that I am eating Never   I eat when I am preparing the meal Never   I eat more than usual when I see others eating Never   I have trouble not eating sweets, ice cream, cookies, or chips if they are around the house Never   I think about food all day Never   What foods, if any, do you crave? Cheese   Please list any other foods you crave? I dont really crave anything.           8/26/2024    12:58 PM   Amount of Food   I feel out of control when eating Never   I eat a large amount of food, like a loaf of bread, a box of cookies, a pint/quart of ice cream, all at once Never   I eat a large amount of food even when I am not hungry Never   I eat rapidly Never   I eat alone because I feel embarrassed and do not want others to see how much I have eaten Never   I eat until I am uncomfortably full Never   I feel bad, disgusted, or guilty after I overeat Monthly           8/26/2024    12:58 PM   Activity/Exercise History   How much of a typical 12 hour day do you spend sitting? Less Than Half the Day   How much of a typical 12 hour day do you spend lying down? Less Than Half the Day   How much of a typical day do you spend walking/standing? Most of the Day   How many hours (not including work) do you spend on the TV/Video Games/Computer/Tablet/Phone? 2-3 Hours   How many times a week are you active for the purpose of exercise? Never   What keeps you from being more active? Other   How many  total minutes do you spend doing some activity for the purpose of exercising when you exercise? Less Than 15 Minutes       PAST MEDICAL HISTORY:  Past Medical History:   Diagnosis Date    Foot pain     Gastroesophageal reflux disease without esophagitis     Hirsutism     Low back pain     Migraine     PCOS (polycystic ovarian syndrome)     Severe obesity (BMI >= 40) (H)            8/26/2024    12:58 PM   Work/Social History Reviewed With Patient   My employment status is Part-Time   My job is    How much of your job is spent on the computer or phone? Less Than 50%   How many hours do you spend commuting to work daily? 1 hour   What is your marital status? /In a Relationship   If in a relationship, is your significant other overweight? Yes   If you have children, are they overweight? No   Who do you live with? My fiance and son   Who does the food shopping? Both of us           8/26/2024    12:58 PM   Mental Health History Reviewed With Patient   Have you ever been physically or sexually abused? No   How often in the past 2 weeks have you felt little interest or pleasure in doing things? For Several Days   Over the past 2 weeks how often have you felt down, depressed, or hopeless? For Several Days           8/26/2024    12:58 PM   Sleep History Reviewed With Patient   How many hours do you sleep at night? 7       MEDICATIONS:   Current Outpatient Medications   Medication Sig Dispense Refill    albuterol (PROAIR HFA/PROVENTIL HFA/VENTOLIN HFA) 108 (90 Base) MCG/ACT inhaler Inhale 2 puffs into the lungs every 4 hours as needed      busPIRone (BUSPAR) 7.5 MG tablet Take 7.5 mg by mouth 2 times daily      IBU OR None Entered      omeprazole (PRILOSEC) 40 MG DR capsule Take 40 mg by mouth as needed      propranolol (INDERAL) 40 MG tablet TAKE 2 TABLETS BY MOUTH IN THE MORNING AND 2 TABLETS AT BEDTIME*      RELPAX 40 MG tablet TAKE 1 TABLET BY MOUTH AT THE ONSET OF MIGRAINE.  OK TO REPEAT IN 2 HOURS.   "DO NOT EXCEED 2 TABS IN 24 HOURS.*      [START ON 11/19/2024] tirzepatide-Weight Management (ZEPBOUND) 10 MG/0.5ML prefilled pen Inject 0.5 mLs (10 mg) subcutaneously every 7 days for 28 days. Do not start before November 19, 2024. 2 mL 0    [START ON 12/17/2024] tirzepatide-Weight Management (ZEPBOUND) 12.5 MG/0.5ML prefilled pen Inject 0.5 mLs (12.5 mg) subcutaneously every 7 days for 28 days. Do not start before December 17, 2024. 2 mL 0    [START ON 1/14/2025] tirzepatide-Weight Management (ZEPBOUND) 15 MG/0.5ML prefilled pen Inject 0.5 mLs (15 mg) subcutaneously every 7 days. Do not start before January 14, 2025. 6 mL 3    tirzepatide-Weight Management (ZEPBOUND) 2.5 MG/0.5ML prefilled pen Inject 0.5 mLs (2.5 mg) subcutaneously every 7 days for 28 days. 2 mL 0    [START ON 9/24/2024] tirzepatide-Weight Management (ZEPBOUND) 5 MG/0.5ML prefilled pen Inject 0.5 mLs (5 mg) subcutaneously every 7 days for 28 days. Do not start before September 24, 2024. 2 mL 0    [START ON 10/22/2024] tirzepatide-Weight Management (ZEPBOUND) 7.5 MG/0.5ML prefilled pen Inject 0.5 mLs (7.5 mg) subcutaneously every 7 days for 28 days. Do not start before October 22, 2024. 2 mL 0    TYLENOL OR as directed         ALLERGIES:   No Known Allergies    PHYSICAL EXAM:  /78   Ht 1.638 m (5' 4.5\")   Wt 134.7 kg (297 lb)   LMP 07/26/2024 (Exact Date)   BMI 50.19 kg/m      Waist circumference: 56 cm (hips 60)    Wt Readings from Last 4 Encounters:   08/27/24 134.7 kg (297 lb)   01/15/24 135 kg (297 lb 9 oz)   08/05/09 85.4 kg (188 lb 3.2 oz) (96%, Z= 1.80)*   10/16/08 89.8 kg (198 lb) (98%, Z= 1.97)*     * Growth percentiles are based on CDC (Girls, 2-20 Years) data.   Pleasant and in no distress  Neck 15\" Mallampati 2-3+  Heart regular  Lungs clear  Abdominal circumference 56\"  A & O x 3  Euthymic  Gait normal  Skin hirsutism    FOLLOW-UP:   Scheduled     TIME: 45 min spent on evaluation, management, counseling, education, & " motivational interviewing    Sincerely,    Karissa Morales MD

## 2024-08-27 NOTE — PATIENT INSTRUCTIONS
[facility] Bariatric Basics    Remember to:    -Eat 3 meals a day (not 2, not 5) Chew your food well/SLOW down  -Eat your protein first  -Be a water drinker/Minize liquid calories (no regular pop, no juice) skim or 1% milk OK  -Sleep 7-8 hours each night. Address sleep if problematic  -Stress management is important. Address if problematic  -Move-8000 steps daily Muscle: maintain your muscle mass (strength training 2X/wk)  -Wheat, not white (bread, pasta, crackers, adarsh, bagels, tortillas, rice)  -Limit restaurant, cafeteria, take out, drive through to 2 times per week or less  -Minimize caffeine, alcohol, and night-time snacking  -Consider keeping a food diary (i.e. My Fitness Pal, Lose It, or other food tracker)  -Follow up with the dietitian      **Some lean proteins: chicken, turkey, tuna, salmon, crab, fish, shrimp, scallops, lobster, lean cuts of beef and pork, luncheon meats, veggie burgers, beans (black, lima, garbanzo, padilla, kidney, refried), chile, cottage cheese, string cheese, other cheese, eggs, tofu, peanut butter, nuts, vegan crumbles, greek yogurt

## 2024-09-03 ENCOUNTER — LAB (OUTPATIENT)
Dept: LAB | Facility: CLINIC | Age: 34
End: 2024-09-03
Payer: COMMERCIAL

## 2024-09-03 DIAGNOSIS — E66.01 SEVERE OBESITY (BMI >= 40) (H): ICD-10-CM

## 2024-09-03 LAB
ERYTHROCYTE [DISTWIDTH] IN BLOOD BY AUTOMATED COUNT: 12.4 % (ref 10–15)
HBA1C MFR BLD: 5 % (ref 0–5.6)
HCT VFR BLD AUTO: 43 % (ref 35–47)
HGB BLD-MCNC: 14 G/DL (ref 11.7–15.7)
MCH RBC QN AUTO: 27.5 PG (ref 26.5–33)
MCHC RBC AUTO-ENTMCNC: 32.6 G/DL (ref 31.5–36.5)
MCV RBC AUTO: 84 FL (ref 78–100)
PLATELET # BLD AUTO: 230 10E3/UL (ref 150–450)
RBC # BLD AUTO: 5.1 10E6/UL (ref 3.8–5.2)
WBC # BLD AUTO: 10.2 10E3/UL (ref 4–11)

## 2024-09-03 PROCEDURE — 83718 ASSAY OF LIPOPROTEIN: CPT

## 2024-09-03 PROCEDURE — 85027 COMPLETE CBC AUTOMATED: CPT

## 2024-09-03 PROCEDURE — 83970 ASSAY OF PARATHORMONE: CPT

## 2024-09-03 PROCEDURE — 83036 HEMOGLOBIN GLYCOSYLATED A1C: CPT

## 2024-09-03 PROCEDURE — 83721 ASSAY OF BLOOD LIPOPROTEIN: CPT

## 2024-09-03 PROCEDURE — 36415 COLL VENOUS BLD VENIPUNCTURE: CPT

## 2024-09-03 PROCEDURE — 82607 VITAMIN B-12: CPT

## 2024-09-03 PROCEDURE — 84443 ASSAY THYROID STIM HORMONE: CPT

## 2024-09-03 PROCEDURE — 82306 VITAMIN D 25 HYDROXY: CPT

## 2024-09-03 PROCEDURE — 82728 ASSAY OF FERRITIN: CPT

## 2024-09-03 PROCEDURE — 80053 COMPREHEN METABOLIC PANEL: CPT

## 2024-09-04 LAB
ALBUMIN SERPL BCG-MCNC: 4 G/DL (ref 3.5–5.2)
ALP SERPL-CCNC: 79 U/L (ref 40–150)
ALT SERPL W P-5'-P-CCNC: 38 U/L (ref 0–50)
ANION GAP SERPL CALCULATED.3IONS-SCNC: 11 MMOL/L (ref 7–15)
AST SERPL W P-5'-P-CCNC: 25 U/L (ref 0–45)
BILIRUB SERPL-MCNC: 0.3 MG/DL
BUN SERPL-MCNC: 20.5 MG/DL (ref 6–20)
CALCIUM SERPL-MCNC: 9.1 MG/DL (ref 8.8–10.4)
CHLORIDE SERPL-SCNC: 104 MMOL/L (ref 98–107)
CREAT SERPL-MCNC: 0.73 MG/DL (ref 0.51–0.95)
EGFRCR SERPLBLD CKD-EPI 2021: >90 ML/MIN/1.73M2
FASTING STATUS PATIENT QL REPORTED: NO
FASTING STATUS PATIENT QL REPORTED: NO
FERRITIN SERPL-MCNC: 101 NG/ML (ref 6–175)
GLUCOSE SERPL-MCNC: 154 MG/DL (ref 70–99)
HCO3 SERPL-SCNC: 26 MMOL/L (ref 22–29)
HDLC SERPL-MCNC: 39 MG/DL
LDLC SERPL DIRECT ASSAY-MCNC: 108 MG/DL
POTASSIUM SERPL-SCNC: 4.2 MMOL/L (ref 3.4–5.3)
PROT SERPL-MCNC: 6.9 G/DL (ref 6.4–8.3)
PTH-INTACT SERPL-MCNC: 42 PG/ML (ref 15–65)
SODIUM SERPL-SCNC: 141 MMOL/L (ref 135–145)
TSH SERPL DL<=0.005 MIU/L-ACNC: 1.25 UIU/ML (ref 0.3–4.2)
VIT B12 SERPL-MCNC: 555 PG/ML (ref 232–1245)
VIT D+METAB SERPL-MCNC: 18 NG/ML (ref 20–50)

## 2024-11-05 DIAGNOSIS — E66.01 SEVERE OBESITY (BMI >= 40) (H): ICD-10-CM

## 2024-11-05 RX ORDER — TIRZEPATIDE 2.5 MG/.5ML
INJECTION, SOLUTION SUBCUTANEOUS
Qty: 2 ML | Refills: 0 | Status: SHIPPED | OUTPATIENT
Start: 2024-11-11

## 2025-01-20 PROBLEM — J18.9 COMMUNITY ACQUIRED PNEUMONIA: Status: ACTIVE | Noted: 2025-01-20

## 2025-01-21 PROBLEM — R05.1 ACUTE COUGH: Status: ACTIVE | Noted: 2025-01-20

## 2025-01-22 ENCOUNTER — ANCILLARY PROCEDURE (OUTPATIENT)
Dept: GENERAL RADIOLOGY | Facility: CLINIC | Age: 35
End: 2025-01-22
Attending: STUDENT IN AN ORGANIZED HEALTH CARE EDUCATION/TRAINING PROGRAM
Payer: COMMERCIAL

## 2025-01-22 ENCOUNTER — TELEPHONE (OUTPATIENT)
Dept: FAMILY MEDICINE | Facility: CLINIC | Age: 35
End: 2025-01-22

## 2025-01-22 ENCOUNTER — OFFICE VISIT (OUTPATIENT)
Dept: FAMILY MEDICINE | Facility: CLINIC | Age: 35
End: 2025-01-22
Payer: COMMERCIAL

## 2025-01-22 VITALS
DIASTOLIC BLOOD PRESSURE: 90 MMHG | RESPIRATION RATE: 18 BRPM | HEART RATE: 107 BPM | BODY MASS INDEX: 46.48 KG/M2 | WEIGHT: 279 LBS | HEIGHT: 65 IN | TEMPERATURE: 99.5 F | SYSTOLIC BLOOD PRESSURE: 122 MMHG | OXYGEN SATURATION: 94 %

## 2025-01-22 DIAGNOSIS — J45.21 MILD INTERMITTENT REACTIVE AIRWAY DISEASE WITH ACUTE EXACERBATION: ICD-10-CM

## 2025-01-22 DIAGNOSIS — J18.9 PNEUMONIA OF RIGHT LOWER LOBE DUE TO INFECTIOUS ORGANISM: ICD-10-CM

## 2025-01-22 DIAGNOSIS — J18.9 PNEUMONIA OF RIGHT LOWER LOBE DUE TO INFECTIOUS ORGANISM: Primary | ICD-10-CM

## 2025-01-22 PROCEDURE — 71046 X-RAY EXAM CHEST 2 VIEWS: CPT | Mod: TC | Performed by: RADIOLOGY

## 2025-01-22 PROCEDURE — 99214 OFFICE O/P EST MOD 30 MIN: CPT | Performed by: STUDENT IN AN ORGANIZED HEALTH CARE EDUCATION/TRAINING PROGRAM

## 2025-01-22 PROCEDURE — G2211 COMPLEX E/M VISIT ADD ON: HCPCS | Performed by: STUDENT IN AN ORGANIZED HEALTH CARE EDUCATION/TRAINING PROGRAM

## 2025-01-22 RX ORDER — AMOXICILLIN AND CLAVULANATE POTASSIUM 562.5; 437.5; 62.5 MG/1; MG/1; MG/1
2 TABLET, MULTILAYER, EXTENDED RELEASE ORAL 2 TIMES DAILY
Qty: 20 TABLET | Refills: 0 | Status: SHIPPED | OUTPATIENT
Start: 2025-01-22 | End: 2025-01-22

## 2025-01-22 RX ORDER — BENZONATATE 100 MG/1
100 CAPSULE ORAL
COMMUNITY
Start: 2025-01-07

## 2025-01-22 RX ORDER — RIZATRIPTAN BENZOATE 10 MG/1
TABLET ORAL
COMMUNITY
Start: 2024-03-05

## 2025-01-22 RX ORDER — METHYLPREDNISOLONE 4 MG/1
TABLET ORAL
Qty: 21 TABLET | Refills: 0 | Status: SHIPPED | OUTPATIENT
Start: 2025-01-22

## 2025-01-22 RX ORDER — AZITHROMYCIN 250 MG/1
TABLET, FILM COATED ORAL
Qty: 6 TABLET | Refills: 0 | Status: SHIPPED | OUTPATIENT
Start: 2025-01-22 | End: 2025-01-22

## 2025-01-22 RX ORDER — ONDANSETRON 8 MG/1
TABLET, ORALLY DISINTEGRATING ORAL
COMMUNITY
Start: 2024-10-30

## 2025-01-22 RX ORDER — AZITHROMYCIN 250 MG/1
TABLET, FILM COATED ORAL
Qty: 6 TABLET | Refills: 0 | Status: SHIPPED | OUTPATIENT
Start: 2025-01-22 | End: 2025-01-27

## 2025-01-22 ASSESSMENT — PATIENT HEALTH QUESTIONNAIRE - PHQ9
10. IF YOU CHECKED OFF ANY PROBLEMS, HOW DIFFICULT HAVE THESE PROBLEMS MADE IT FOR YOU TO DO YOUR WORK, TAKE CARE OF THINGS AT HOME, OR GET ALONG WITH OTHER PEOPLE: SOMEWHAT DIFFICULT
SUM OF ALL RESPONSES TO PHQ QUESTIONS 1-9: 7
SUM OF ALL RESPONSES TO PHQ QUESTIONS 1-9: 7

## 2025-01-22 ASSESSMENT — ENCOUNTER SYMPTOMS: COUGH: 1

## 2025-01-22 ASSESSMENT — PAIN SCALES - GENERAL: PAINLEVEL_OUTOF10: MILD PAIN (3)

## 2025-01-22 NOTE — PATIENT INSTRUCTIONS
Yolis,    Thank you for allowing [facility] to manage your care today.    Our plan is as follows:    I sent your prescriptions to your pharmacy.  Take Augmentin twice daily for 5 days   Take Azithromycin daily for 5 days  Take medrol pack steroid, follow instructions on pack   breathing device (address on order sheet)    Follow-up in 1 week    If you have any questions or concerns, please feel free to call us at [phone].    Your partner in health,    Dr. Arnold      Did you know?    You can schedule a video visit for follow-up appointments as well as future appointments for certain conditions. Please see the below link.     [email or web address]    If you have not already done so, I encourage you to sign up for [facility] ([email or web address]). This will allow you to review your results, securely communicate with a provider, and schedule virtual visits as well.

## 2025-01-22 NOTE — TELEPHONE ENCOUNTER
Routing to Dr. Arnold    Pharmacy calling. They state that the patient's insurance will not cover the currently ordered dose of Augmentin 1000-62.5mg. Pharmacist said that insurance will cover 875-125mg twice daily dose (not extended release). Per pharmacy, consider Augmentin 875-125mg with Z-pack.     RN pended Augmentin 875-125mg and Z-pack if appropriate. Please review and advise.    Arabella BONNER RN  [facility] Triage

## 2025-01-22 NOTE — PROGRESS NOTES
[facility] Note    Yolis Lei is a 34 year old female here for ED follow-up and pneumonia.    Assessment & Plan     Pneumonia of right lower lobe due to infectious organism  Mild intermittent reactive airway disease with acute exacerbation  Patient presents with worsening symptoms since completing CAP treatment   Patient had improvement while on ABX+steroid   Ongoing productive cough with low grade fevers   RML fields with crackles and wheeze, VSS  S/p 5 day course of doxycycline and prednisone   Repeat CXR today noted persistent RML opacity  Recommend the following:  ABX course with Augmentin and Azithromycin   Another course of steroids for reactive airway  Continue with albuterol as needed  Bronchial hygiene with incentive spirometer - DME pickup  ED precautions reviewed   Follow-up in 1 week  After shared decision making, patient agreeable with plan   - XR Chest 2 Views; Future  - amoxicillin-clavulanate (AUGMENTIN) 875-125 MG tablet; Take 2 tablets by mouth 2 times daily for 5 days.  - azithromycin (ZITHROMAX) 250 MG tablet; Take 2 tablets (500 mg) by mouth daily for 1 day, THEN 1 tablet (250 mg) daily for 4 days.  - methylPREDNISolone (MEDROL DOSEPAK) 4 MG tablet therapy pack; Follow Package Directions  - Other Respiratory Supplies for DME - ONLY FOR DME    MED REC REQUIRED  Post Medication Reconciliation Status: completed    Shared decision making done during visit, options, concerns, and plan reviewed with the patient. All relevant questions and concerns have been addressed. The patient expressed understanding and is agreeable with the above plan as discussed. AVS provided to patient during office visit via hardcopy and/or MyChart.    Follow-up: Return in about 1 week (around 1/29/2025), or if symptoms worsen or fail to improve, for PNA follow-up.    Christian Arnold MD 1/22/2025 1:41 PM    Note to patient: The 21st Century Cures Act makes medical notes like this available to  patients in the interest of transparency. However, be advised this is a medical document. It is intended as [id number] communication. It is written in medical language and may contain abbreviations or verbiage that are unfamiliar. It may appear blunt or direct. Medical documents are intended to carry relevant information, facts as evident, and the clinical opinion of the practitioner.          Sneha Lagos is a 34 year old, presenting for the following health issues:  Cough (Pneumonia)        1/22/2025     1:16 PM   Additional Questions   Roomed by valery witt ma   Accompanied by self         1/22/2025     1:16 PM   Patient Reported Additional Medications   Patient reports taking the following new medications none     Cough    Patient states that she was diagnosed with pneumonia at the ED.   She states that they gave her antibiotics and steroids   However, after the medication was done, symptoms worsened again  The symptoms are still present and the cough is still ongoing.   Her symptoms affects her especially during morning and bedtime.  Has had low grade fevers and productive cough  No prior history of asthma   History of pneumonia in the past.    Per ED note from 1/7/25:  Yolis Lei is a 34 y.o. female who presents to the emergency department with cough for the past week. Notes symptoms began on New Years raffaele, had a fever to 102 at that time. She notes that after several days of cough she developed diarrhea as well. She notes that cough has worsened in recent days, mostly dry.   She notes a history of asthma, has not been able to find inhaler. No chest pain other than when coughing, no pleuritic pain.     Review of Systems: Please refer to HPI for pertinent positives and negatives.     Medications at ED discharge from 1/7/2025:  Prescription Sig Dispense Quantity Refills Last Filled Start Date End Date   benzonatate (TESSALON) 100 mg capsule   Indications: Pneumonia of right lower lobe due to infectious  "organism Take 1 Capsule (100 mg) by mouth 3 times daily if needed for Cough. 20 Capsule      01/07/2025     rx doxycycline hyclate (VIBRAMYCIN) 100 mg capsule (ED DC MED)   Indications: Pneumonia of right lower lobe due to infectious organism Take 1 Capsule (100 mg) by mouth two times daily.       01/07/2025     rx albuterol HFA (PROVENTIL; VENTOLIN) (90 mcg each actuation) inhaler (ED DC MED)   Indications: Pneumonia of right lower lobe due to infectious organism Inhale 2 Puffs by mouth 4 times daily.       01/07/2025     predniSONE (DELTASONE) 20 mg tablet   Indications: Mild intermittent reactive airway disease with acute exacerbation Take 2 Tablets (40 mg) by mouth once daily for 5 days. 10 Tablet      01/07/2025 01/12/2025   doxycycline 100 mg tablet   Indications: Pneumonia of right lower lobe due to infectious organism Take 1 Tablet (100 mg) by mouth two times daily for 5 days. 10 Tablet      01/07/2025 01/12/2025             Objective    /90 (BP Location: Right arm, Patient Position: Sitting, Cuff Size: Adult Large)   Pulse (!) 107   Temp 99.5  F (37.5  C) (Tympanic)   Resp 18   Ht 1.638 m (5' 4.5\")   Wt 126.6 kg (279 lb)   LMP 01/20/2025   SpO2 94%   Breastfeeding No   BMI 47.15 kg/m    Body mass index is 47.15 kg/m .    Physical Exam  Vitals reviewed.   Constitutional:       Comments: BMI 47.   HENT:      Head: Normocephalic.      Nose: Congestion present.      Mouth/Throat:      Mouth: Mucous membranes are moist.      Pharynx: Oropharynx is clear.   Eyes:      Conjunctiva/sclera: Conjunctivae normal.   Cardiovascular:      Rate and Rhythm: Tachycardia present.      Heart sounds: Normal heart sounds.   Pulmonary:      Effort: No accessory muscle usage or respiratory distress.      Breath sounds: Wheezing, rhonchi and rales present.      Comments: Crackles, wheeze, rhonchi heard in RML. Productive cough heard on exam.  Skin:     General: Skin is warm and dry.   Neurological:      Mental " Status: She is alert.   Psychiatric:         Mood and Affect: Mood normal.         Behavior: Behavior normal.              Signed Electronically by: Christian Arnold MD    .undefined[^^

## 2025-02-04 ENCOUNTER — OFFICE VISIT (OUTPATIENT)
Dept: FAMILY MEDICINE | Facility: CLINIC | Age: 35
End: 2025-02-04
Payer: COMMERCIAL

## 2025-02-04 VITALS
SYSTOLIC BLOOD PRESSURE: 118 MMHG | TEMPERATURE: 97.7 F | HEIGHT: 65 IN | WEIGHT: 273.4 LBS | OXYGEN SATURATION: 96 % | HEART RATE: 74 BPM | BODY MASS INDEX: 45.55 KG/M2 | RESPIRATION RATE: 15 BRPM | DIASTOLIC BLOOD PRESSURE: 84 MMHG

## 2025-02-04 DIAGNOSIS — F33.1 MAJOR DEPRESSIVE DISORDER, RECURRENT EPISODE, MODERATE (H): ICD-10-CM

## 2025-02-04 DIAGNOSIS — J18.9 PNEUMONIA OF RIGHT LOWER LOBE DUE TO INFECTIOUS ORGANISM: Primary | ICD-10-CM

## 2025-02-04 DIAGNOSIS — J45.20 MILD INTERMITTENT ASTHMA WITHOUT COMPLICATION: ICD-10-CM

## 2025-02-04 DIAGNOSIS — R05.2 SUBACUTE COUGH: ICD-10-CM

## 2025-02-04 DIAGNOSIS — R09.81 NASAL CONGESTION: ICD-10-CM

## 2025-02-04 DIAGNOSIS — E66.01 SEVERE OBESITY (BMI >= 40) (H): ICD-10-CM

## 2025-02-04 PROCEDURE — 99214 OFFICE O/P EST MOD 30 MIN: CPT | Performed by: STUDENT IN AN ORGANIZED HEALTH CARE EDUCATION/TRAINING PROGRAM

## 2025-02-04 RX ORDER — CETIRIZINE HYDROCHLORIDE, PSEUDOEPHEDRINE HYDROCHLORIDE 5; 120 MG/1; MG/1
1 TABLET, FILM COATED, EXTENDED RELEASE ORAL 2 TIMES DAILY
Qty: 30 TABLET | Refills: 0 | Status: SHIPPED | OUTPATIENT
Start: 2025-02-04

## 2025-02-04 RX ORDER — BENZONATATE 200 MG/1
200 CAPSULE ORAL 3 TIMES DAILY PRN
Qty: 30 CAPSULE | Refills: 1 | Status: SHIPPED | OUTPATIENT
Start: 2025-02-04

## 2025-02-04 RX ORDER — ALBUTEROL SULFATE 90 UG/1
2 INHALANT RESPIRATORY (INHALATION) EVERY 4 HOURS PRN
Qty: 18 G | Refills: 5 | Status: SHIPPED | OUTPATIENT
Start: 2025-02-04

## 2025-02-04 ASSESSMENT — ASTHMA QUESTIONNAIRES
ACT_TOTALSCORE: 19
QUESTION_5 LAST FOUR WEEKS HOW WOULD YOU RATE YOUR ASTHMA CONTROL: WELL CONTROLLED
QUESTION_2 LAST FOUR WEEKS HOW OFTEN HAVE YOU HAD SHORTNESS OF BREATH: THREE TO SIX TIMES A WEEK
EMERGENCY_ROOM_LAST_YEAR_TOTAL: ONE
QUESTION_3 LAST FOUR WEEKS HOW OFTEN DID YOUR ASTHMA SYMPTOMS (WHEEZING, COUGHING, SHORTNESS OF BREATH, CHEST TIGHTNESS OR PAIN) WAKE YOU UP AT NIGHT OR EARLIER THAN USUAL IN THE MORNING: NOT AT ALL
ACT_TOTALSCORE: 19
QUESTION_1 LAST FOUR WEEKS HOW MUCH OF THE TIME DID YOUR ASTHMA KEEP YOU FROM GETTING AS MUCH DONE AT WORK, SCHOOL OR AT HOME: A LITTLE OF THE TIME
QUESTION_4 LAST FOUR WEEKS HOW OFTEN HAVE YOU USED YOUR RESCUE INHALER OR NEBULIZER MEDICATION (SUCH AS ALBUTEROL): TWO OR THREE TIMES PER WEEK

## 2025-02-04 ASSESSMENT — PAIN SCALES - GENERAL: PAINLEVEL_OUTOF10: NO PAIN (0)

## 2025-02-04 NOTE — PROGRESS NOTES
[facility] Note    Yolis Lei is a 34 year old female here for pneumonia follow-up.    Assessment & Plan     Pneumonia of right lower lobe due to infectious organism  Much improved. Patient presents for follow-up of CAP from 1/22  Now feeling much better since completing antibiotic course.  Lungs are clear today, VSS.  Recommend the following:  Continue with incentive spirometry daily given lack of physical activity   RTC precautions reviewed  After shared decision making, patient agreeable with plan     Subacute cough  Nasal congestion  Much improved but still with cough and nasal congestion with postnasal drip.  Recommend the following:  Continue with Tessalon pearls as needed  Trial course of Zyrtec-D BID for 2 weeks  Nasal saline rinses daily   Follow-up in 1 month if symptoms persist/worsen  After shared decision making, patient agreeable with plan   - benzonatate (TESSALON) 200 MG capsule; Take 1 capsule (200 mg) by mouth 3 times daily as needed for cough.  - cetirizine-pseudoePHEDrine ER (ZYRTEC-D) 5-120 MG 12 hr tablet; Take 1 tablet by mouth 2 times daily.    Mild intermittent asthma without complication  Controlled. Worse in the winter time.  Past week has only needed albuterol twice  Lungs clear on exam today. VSS.  Continue current management, needs refill  After shared decision making, patient agreeable with plan   - albuterol (PROAIR HFA/PROVENTIL HFA/VENTOLIN HFA) 108 (90 Base) MCG/ACT inhaler; Inhale 2 puffs into the lungs every 4 hours as needed for wheezing, cough or shortness of breath.    Body mass index (BMI) 50.0-59.9, adult (H)  Severe obesity (BMI >= 40) (H)  Stable and improving. On Zepbound since 11/2024  Follows with Dr. Liu Morales, next on 2/20  Scheduled with nutritionist on 2/18  Weight management plan: Discussed healthy diet and exercise guidelines  After shared decision making, patient agreeable with plan     Major depressive disorder, recurrent  episode, moderate (H)  Stable on Buspar combined with counseling.   Continue current management   After shared decision making, patient agreeable with plan     AVS provided to patient during office visit via hardcopy and/or MyChart.    Follow-up: Return if symptoms worsen or fail to improve.    Christian Arnold MD 2/4/2025 3:12 PM    Note to patient: The 21st Century Cures Act makes medical notes like this available to patients in the interest of transparency. However, be advised this is a medical document. It is intended as lvam-ck-uaae communication. It is written in medical language and may contain abbreviations or verbiage that are unfamiliar. It may appear blunt or direct. Medical documents are intended to carry relevant information, facts as evident, and the clinical opinion of the practitioner.          Sneha Lagos is a 34 year old, presenting for the following health issues:  Pneumonia Follow Up        2/4/2025     2:48 PM   Additional Questions   Roomed by valery witt ma   Accompanied by          2/4/2025     2:48 PM   Patient Reported Additional Medications   Patient reports taking the following new medications none     History of Present Illness       Reason for visit:  To see how im doing after having Pneumonia for so long    She eats 2-3 servings of fruits and vegetables daily.She consumes 0 sweetened beverage(s) daily.She exercises with enough effort to increase her heart rate 30 to 60 minutes per day.  She exercises with enough effort to increase her heart rate 6 days per week. She is missing 3 dose(s) of medications per week.  She is not taking prescribed medications regularly due to remembering to take.     #PNA  Patient reports today that she is feeling better.   She states that has been more active in general to get up and do things.   She states that she is only having some cough, nasal drainage but better now.  Finished course of antibiotics.  Denies SOB, wheezing, fevers, chest  "pain    Per last clinic visit note with me on 1/22/25:  Patient states that she was diagnosed with pneumonia at the ED.   She states that they gave her antibiotics and steroids   However, after the medication was done, symptoms worsened again  The symptoms are still present and the cough is still ongoing.   Her symptoms affects her especially during morning and bedtime.  Has had low grade fevers and productive cough  History of pneumonia in the past.    #Asthma  Used albuterol 2x past week  No other concerns  Needs refill    #Weight management  Takes medication at lower dose d/t side effects, GI upset  Follows with general surgery and nutrition    #Depression  Taking Buspar and sees counselor  Feeling like things are getting better now    Review of Systems: Please refer to HPI for pertinent positives and negatives.          Objective    /84 (BP Location: Right arm, Patient Position: Sitting, Cuff Size: Adult Large)   Pulse 74   Temp 97.7  F (36.5  C) (Oral)   Resp 15   Ht 1.638 m (5' 4.5\")   Wt 124 kg (273 lb 6.4 oz)   LMP 01/20/2025   SpO2 96%   Breastfeeding No   BMI 46.20 kg/m    Body mass index is 46.2 kg/m .    Physical Exam  Vitals reviewed.   Constitutional:       General: She is not in acute distress.     Appearance: Normal appearance. She is not ill-appearing, toxic-appearing or diaphoretic.   HENT:      Head: Normocephalic and atraumatic.      Nose: Congestion present.   Eyes:      Conjunctiva/sclera: Conjunctivae normal.   Cardiovascular:      Rate and Rhythm: Normal rate and regular rhythm.      Heart sounds: Normal heart sounds.   Pulmonary:      Effort: Pulmonary effort is normal. No respiratory distress.      Breath sounds: Normal breath sounds. No wheezing, rhonchi or rales.   Skin:     General: Skin is warm and dry.   Neurological:      Mental Status: She is alert.   Psychiatric:         Mood and Affect: Mood normal.         Behavior: Behavior normal.            Signed Electronically " by: Christian Arnold MD

## 2025-02-04 NOTE — PATIENT INSTRUCTIONS
Yolis,    Thank you for allowing [facility] to manage your care today.    Our plan is as follows:  I sent your prescriptions to your pharmacy.  Continue albuterol as needed for shortness of breath and wheezing  Continue Tessalon pearls three times daily as needed for cough  Start Zyrtec-D twice daily for nasal congestion   Buy a nasal saline rinse kit at the pharmacy over the counter to help clear out sinuses   Refer to breathing device instructions below to     Follow-up with PCP Dr. Bertrand for preventive visit soon.    If you have any questions or concerns, please use [employer] message and/or feel free to call us at [phone].    Your partner in health,    Dr. Arnold      Did you know?    You can schedule a video visit for follow-up appointments as well as future appointments for certain conditions. Please see the below link.     [email or web address]    If you have not already done so, I encourage you to sign up for [email or web address] ([email or web address]). This will allow you to review your results, securely communicate with a provider, and schedule virtual visits as well.        Respiratory Supplies  Medical Equipment (DME) Supplier: [employer] [facility]  PATIENT INSTRUCTIONS: If you did not receive this ordered item today, please contact [employer] [facility] for availability ([facility] Locations: [phone], [place]: [phone]).  Start Date: 2025  Type: Incentive Spirometer  Length of need: Lifetime  If you have not received your supplies by the time you leave your appointment, please contact [employer]  [facility] to follow up and receive what you need.  Contact Information  Hours  Locations:  Business Hours: Monday - Friday 8am - 4:30pm  Phone: [phone]  Visit [email or web address]  Sharona Robbins  [facility]

## 2025-04-16 ENCOUNTER — TELEPHONE (OUTPATIENT)
Dept: FAMILY MEDICINE | Facility: CLINIC | Age: 35
End: 2025-04-16

## 2025-04-16 NOTE — LETTER
April 16, 2025    Yolis Lei  [address]  [place] [address]    Hello,     Your care team at [facility] values your health and well-being. After reviewing your chart, we have identified recommendation(s) to help you better manage your health.    It's time for your Annual Wellness visit. During your visit, we'll discuss your health, well-being, and any questions you may have related to preventive care. We'll also review any recommended tests, exams, or screenings you might need. To schedule please call your clinic [phone] or use your [employer] account.     If you recently had or are having any of these services soon, please contact the clinic via phone or [employer] so that your care team can update your records.    We look forward to seeing you at your upcoming visit.    If you have any questions or concerns, please contact our clinic. Thank you for continuing to trust us with your care.    Sincerely,    Your [facility] Care Team

## 2025-04-16 NOTE — TELEPHONE ENCOUNTER
Patient Quality Outreach    Patient is due for the following:   Physical Preventive Adult Physical, PAP Screening    Action(s) Taken:   Schedule a Adult Preventative    Type of outreach:    Sent [employer] message.    Questions for provider review:    None         Parag Salazar MA  Chart routed to Care Team.

## 2025-04-17 ENCOUNTER — TELEPHONE (OUTPATIENT)
Dept: FAMILY MEDICINE | Facility: CLINIC | Age: 35
End: 2025-04-17

## 2025-04-17 NOTE — TELEPHONE ENCOUNTER
Patient Quality Outreach    Patient is due for the following:   Cervical Cancer Screening - PAP Needed  Physical Preventive Adult Physical      Topic Date Due    Pneumococcal Vaccine (1 of 2 - PCV) Never done    Hepatitis B Vaccine (1 of 3 - 19+ 3-dose series) Never done    HPV Vaccine (3 - 3-dose series) 02/06/2015    Diptheria Tetanus Pertussis (DTAP/TDAP/TD) Vaccine (3 - Td or Tdap) 10/08/2023    Flu Vaccine (1) 09/01/2024    COVID-19 Vaccine (3 - 2024-25 season) 09/01/2024       Action(s) Taken:   Schedule a office visit for Cervical Cancer Screening, Immunization and  Adult Preventative    Type of outreach:    Sent [employer] message.    Questions for provider review:    None         Parag Salazar MA  Chart routed to Care Team.

## 2025-04-17 NOTE — LETTER
April 17, 2025    Yolis Lei  [address]  [place] [address]    Hello,     Your care team at [facility] values your health and well-being. After reviewing your chart, we have identified recommendation(s) to help you better manage your health.    It's time for a follow-up visit to manage your Immunizations, Cervical Cancer Screening - Pap smear, Annual Physical.     If you recently had or are having any of these services soon, please contact the clinic via phone or [employer] so that your care team can update your records.    We look forward to seeing you at your upcoming visit.    If you have any questions or concerns, please contact our clinic. Thank you for continuing to trust us with your care.    Sincerely,    Your [facility] Care Team

## 2025-06-18 ENCOUNTER — TELEPHONE (OUTPATIENT)
Dept: FAMILY MEDICINE | Facility: CLINIC | Age: 35
End: 2025-06-18
Payer: COMMERCIAL

## 2025-07-03 ENCOUNTER — TELEPHONE (OUTPATIENT)
Dept: FAMILY MEDICINE | Facility: CLINIC | Age: 35
End: 2025-07-03
Payer: COMMERCIAL

## 2025-07-03 NOTE — TELEPHONE ENCOUNTER
Patient Quality Outreach    Patient is due for the following:   Cervical Cancer Screening - PAP Needed  Physical Preventive Adult Physical      Topic Date Due    Pneumococcal Vaccine (1 of 2 - PCV) Never done    Hepatitis B Vaccine (1 of 3 - 19+ 3-dose series) Never done    HPV Vaccine (3 - 3-dose series) 02/06/2015    Diptheria Tetanus Pertussis (DTAP/TDAP/TD) Vaccine (3 - Td or Tdap) 10/08/2023    COVID-19 Vaccine (3 - 2024-25 season) 09/01/2024       Action(s) Taken:   Schedule a Adult Preventative    Type of outreach:    Sent [employer] message.    Questions for provider review:    None         Maria Teresa Hoover, [facility]  Chart routed to None.

## 2025-07-13 ENCOUNTER — HEALTH MAINTENANCE LETTER (OUTPATIENT)
Age: 35
End: 2025-07-13